# Patient Record
Sex: FEMALE | Race: ASIAN | NOT HISPANIC OR LATINO | URBAN - METROPOLITAN AREA
[De-identification: names, ages, dates, MRNs, and addresses within clinical notes are randomized per-mention and may not be internally consistent; named-entity substitution may affect disease eponyms.]

---

## 2024-10-19 ENCOUNTER — EMERGENCY (EMERGENCY)
Facility: HOSPITAL | Age: 24
LOS: 1 days | Discharge: HOME | End: 2024-10-19
Attending: EMERGENCY MEDICINE | Admitting: EMERGENCY MEDICINE
Payer: COMMERCIAL

## 2024-10-19 VITALS
OXYGEN SATURATION: 100 % | HEIGHT: 63 IN | TEMPERATURE: 98 F | DIASTOLIC BLOOD PRESSURE: 75 MMHG | HEART RATE: 79 BPM | RESPIRATION RATE: 16 BRPM | WEIGHT: 103.62 LBS | SYSTOLIC BLOOD PRESSURE: 110 MMHG

## 2024-10-19 VITALS
SYSTOLIC BLOOD PRESSURE: 120 MMHG | DIASTOLIC BLOOD PRESSURE: 75 MMHG | TEMPERATURE: 98 F | OXYGEN SATURATION: 98 % | RESPIRATION RATE: 16 BRPM | HEART RATE: 68 BPM

## 2024-10-19 LAB
ALBUMIN SERPL ELPH-MCNC: 4.2 G/DL — SIGNIFICANT CHANGE UP (ref 3.3–5)
ALP SERPL-CCNC: 67 U/L — SIGNIFICANT CHANGE UP (ref 30–120)
ALT FLD-CCNC: 20 U/L — SIGNIFICANT CHANGE UP (ref 10–60)
ANION GAP SERPL CALC-SCNC: 9 MMOL/L — SIGNIFICANT CHANGE UP (ref 5–17)
APPEARANCE UR: CLEAR — SIGNIFICANT CHANGE UP
AST SERPL-CCNC: 15 U/L — SIGNIFICANT CHANGE UP (ref 10–40)
BASOPHILS # BLD AUTO: 0.03 K/UL — SIGNIFICANT CHANGE UP (ref 0–0.2)
BASOPHILS NFR BLD AUTO: 0.6 % — SIGNIFICANT CHANGE UP (ref 0–2)
BILIRUB SERPL-MCNC: 0.5 MG/DL — SIGNIFICANT CHANGE UP (ref 0.2–1.2)
BILIRUB UR-MCNC: NEGATIVE — SIGNIFICANT CHANGE UP
BUN SERPL-MCNC: 18 MG/DL — SIGNIFICANT CHANGE UP (ref 7–23)
CALCIUM SERPL-MCNC: 9.5 MG/DL — SIGNIFICANT CHANGE UP (ref 8.4–10.5)
CHLORIDE SERPL-SCNC: 104 MMOL/L — SIGNIFICANT CHANGE UP (ref 96–108)
CO2 SERPL-SCNC: 25 MMOL/L — SIGNIFICANT CHANGE UP (ref 22–31)
COLOR SPEC: YELLOW — SIGNIFICANT CHANGE UP
CREAT SERPL-MCNC: 0.82 MG/DL — SIGNIFICANT CHANGE UP (ref 0.5–1.3)
DIFF PNL FLD: ABNORMAL
EGFR: 102 ML/MIN/1.73M2 — SIGNIFICANT CHANGE UP
EOSINOPHIL # BLD AUTO: 0.01 K/UL — SIGNIFICANT CHANGE UP (ref 0–0.5)
EOSINOPHIL NFR BLD AUTO: 0.2 % — SIGNIFICANT CHANGE UP (ref 0–6)
ERYTHROCYTE [SEDIMENTATION RATE] IN BLOOD: 11 MM/HR — SIGNIFICANT CHANGE UP (ref 0–20)
FLUAV AG NPH QL: SIGNIFICANT CHANGE UP
FLUBV AG NPH QL: SIGNIFICANT CHANGE UP
GLUCOSE SERPL-MCNC: 91 MG/DL — SIGNIFICANT CHANGE UP (ref 70–99)
GLUCOSE UR QL: NEGATIVE MG/DL — SIGNIFICANT CHANGE UP
HCG UR QL: NEGATIVE — SIGNIFICANT CHANGE UP
HCT VFR BLD CALC: 38.3 % — SIGNIFICANT CHANGE UP (ref 34.5–45)
HGB BLD-MCNC: 12.8 G/DL — SIGNIFICANT CHANGE UP (ref 11.5–15.5)
IMM GRANULOCYTES NFR BLD AUTO: 0 % — SIGNIFICANT CHANGE UP (ref 0–0.9)
KETONES UR-MCNC: ABNORMAL MG/DL
LEUKOCYTE ESTERASE UR-ACNC: NEGATIVE — SIGNIFICANT CHANGE UP
LYMPHOCYTES # BLD AUTO: 2.16 K/UL — SIGNIFICANT CHANGE UP (ref 1–3.3)
LYMPHOCYTES # BLD AUTO: 41.1 % — SIGNIFICANT CHANGE UP (ref 13–44)
MCHC RBC-ENTMCNC: 31.2 PG — SIGNIFICANT CHANGE UP (ref 27–34)
MCHC RBC-ENTMCNC: 33.4 G/DL — SIGNIFICANT CHANGE UP (ref 32–36)
MCV RBC AUTO: 93.4 FL — SIGNIFICANT CHANGE UP (ref 80–100)
MONOCYTES # BLD AUTO: 0.6 K/UL — SIGNIFICANT CHANGE UP (ref 0–0.9)
MONOCYTES NFR BLD AUTO: 11.4 % — SIGNIFICANT CHANGE UP (ref 2–14)
NEUTROPHILS # BLD AUTO: 2.45 K/UL — SIGNIFICANT CHANGE UP (ref 1.8–7.4)
NEUTROPHILS NFR BLD AUTO: 46.7 % — SIGNIFICANT CHANGE UP (ref 43–77)
NITRITE UR-MCNC: NEGATIVE — SIGNIFICANT CHANGE UP
NRBC # BLD: 0 /100 WBCS — SIGNIFICANT CHANGE UP (ref 0–0)
PH UR: 5.5 — SIGNIFICANT CHANGE UP (ref 5–8)
PLATELET # BLD AUTO: 232 K/UL — SIGNIFICANT CHANGE UP (ref 150–400)
POTASSIUM SERPL-MCNC: 3.5 MMOL/L — SIGNIFICANT CHANGE UP (ref 3.5–5.3)
POTASSIUM SERPL-SCNC: 3.5 MMOL/L — SIGNIFICANT CHANGE UP (ref 3.5–5.3)
PROT SERPL-MCNC: 7.6 G/DL — SIGNIFICANT CHANGE UP (ref 6–8.3)
PROT UR-MCNC: 30 MG/DL
RAPID RVP RESULT: SIGNIFICANT CHANGE UP
RBC # BLD: 4.1 M/UL — SIGNIFICANT CHANGE UP (ref 3.8–5.2)
RBC # FLD: 12.5 % — SIGNIFICANT CHANGE UP (ref 10.3–14.5)
RSV RNA NPH QL NAA+NON-PROBE: SIGNIFICANT CHANGE UP
S PYO DNA THROAT QL NAA+PROBE: SIGNIFICANT CHANGE UP
SARS-COV-2 RNA SPEC QL NAA+PROBE: SIGNIFICANT CHANGE UP
SARS-COV-2 RNA SPEC QL NAA+PROBE: SIGNIFICANT CHANGE UP
SODIUM SERPL-SCNC: 138 MMOL/L — SIGNIFICANT CHANGE UP (ref 135–145)
SP GR SPEC: 1.03 — HIGH (ref 1–1.03)
UROBILINOGEN FLD QL: 1 MG/DL — SIGNIFICANT CHANGE UP (ref 0.2–1)
WBC # BLD: 5.25 K/UL — SIGNIFICANT CHANGE UP (ref 3.8–10.5)
WBC # FLD AUTO: 5.25 K/UL — SIGNIFICANT CHANGE UP (ref 3.8–10.5)

## 2024-10-19 PROCEDURE — 81001 URINALYSIS AUTO W/SCOPE: CPT

## 2024-10-19 PROCEDURE — 86664 EPSTEIN-BARR NUCLEAR ANTIGEN: CPT

## 2024-10-19 PROCEDURE — 87637 SARSCOV2&INF A&B&RSV AMP PRB: CPT

## 2024-10-19 PROCEDURE — 85652 RBC SED RATE AUTOMATED: CPT

## 2024-10-19 PROCEDURE — 99283 EMERGENCY DEPT VISIT LOW MDM: CPT | Mod: 25

## 2024-10-19 PROCEDURE — 99284 EMERGENCY DEPT VISIT MOD MDM: CPT

## 2024-10-19 PROCEDURE — 85025 COMPLETE CBC W/AUTO DIFF WBC: CPT

## 2024-10-19 PROCEDURE — 36415 COLL VENOUS BLD VENIPUNCTURE: CPT

## 2024-10-19 PROCEDURE — 87651 STREP A DNA AMP PROBE: CPT

## 2024-10-19 PROCEDURE — 71046 X-RAY EXAM CHEST 2 VIEWS: CPT

## 2024-10-19 PROCEDURE — 86663 EPSTEIN-BARR ANTIBODY: CPT

## 2024-10-19 PROCEDURE — 80053 COMPREHEN METABOLIC PANEL: CPT

## 2024-10-19 PROCEDURE — 86665 EPSTEIN-BARR CAPSID VCA: CPT

## 2024-10-19 PROCEDURE — 87798 DETECT AGENT NOS DNA AMP: CPT

## 2024-10-19 PROCEDURE — 0225U NFCT DS DNA&RNA 21 SARSCOV2: CPT

## 2024-10-19 PROCEDURE — 71046 X-RAY EXAM CHEST 2 VIEWS: CPT | Mod: 26

## 2024-10-19 PROCEDURE — 81025 URINE PREGNANCY TEST: CPT

## 2024-10-19 NOTE — ED PROVIDER NOTE - PATIENT PORTAL LINK FT
You can access the FollowMyHealth Patient Portal offered by [facility] by registering at the following website: [email or web address]. By joining [employer]’s FollowMyHealth portal, you will also be able to view your health information using other applications (apps) compatible with our system.

## 2024-10-19 NOTE — ED ADULT NURSE NOTE - NSFALLUNIVINTERV_ED_ALL_ED
Bed/Stretcher in lowest position, wheels locked, appropriate side rails in place/Call bell, personal items and telephone in reach/Instruct patient to call for assistance before getting out of bed/chair/stretcher/Non-slip footwear applied when patient is off stretcher/[place] to call system/Physically safe environment - no spills, clutter or unnecessary equipment/Purposeful proactive rounding/Room/bathroom lighting operational, light cord in reach

## 2024-10-19 NOTE — ED PROVIDER NOTE - OBJECTIVE STATEMENT
24-year-old female with no past medical history presents with complaint of multiple swollen lymph nodes x 6 weeks.  Patient states that she initially noticed two swollen lymph nodes to the left side of her neck 4 to 6 weeks ago.  States that she saw her PCP at her [employer] on 10/16 who found another small lymph node in her left axilla and had blood work done including sed rate, was informed to follow-up in 2 weeks for repeat sed rate and evaluation.  States that she has mild occasional nasal congestion.  Patient and mother states that she did not have any viral testing done with PCP.  Denies fever, chills, nausea, vomiting, cough, chest pain, shortness of breath, abdominal pain, urinary symptoms, body aches, night sweats, weight loss, recent travel, sore throat, difficulty swallowing, rash, headache, neck stiffness or other symptoms. 24-year-old female with no past medical history presents with complaint of multiple swollen lymph nodes x 6 weeks.  Patient states that she initially noticed two swollen lymph nodes to the left side of her neck 4 to 6 weeks ago.  States that she saw her PCP at her [place] on 10/16 who found another small lymph node in her left axilla and had blood work done including sed rate, was informed to follow-up in 2 weeks for repeat sed rate and evaluation.  States that she has mild occasional nasal congestion.  Patient and mother states that she did not have any viral testing done with PCP. Pt currently on her menstruation. Denies fever, chills, nausea, vomiting, cough, chest pain, shortness of breath, abdominal pain, urinary symptoms, body aches, night sweats, weight loss, recent travel, sore throat, difficulty swallowing, rash, headache, neck stiffness or other symptoms. 24-year-old female with no past medical history presents with complaint of multiple swollen lymph nodes x 6 weeks.  Patient states that she initially noticed two swollen lymph nodes to the left side of her neck 4 to 6 weeks ago.  States that she [facility] on 10/[facility] who found another small lymph node in her left axilla and had blood work done, was informed to follow-up in 2 weeks for repeat sed rate and evaluation.  States that she has mild occasional nasal congestion.  Patient and mother states that she did not have any viral testing done with PCP. Pt currently on her menstruation. Denies fever, chills, nausea, vomiting, cough, chest pain, shortness of breath, abdominal pain, urinary symptoms, body aches, night sweats, weight loss, recent travel, sore throat, difficulty swallowing, rash, headache, neck stiffness or other symptoms.

## 2024-10-19 NOTE — ED PROVIDER NOTE - MUSCULOSKELETAL, MLM
Refill per protocol.   Spine appears normal, range of motion is not limited, no muscle or joint tenderness

## 2024-10-19 NOTE — ED PROVIDER NOTE - ENMT NEGATIVE STATEMENT, MLM
Ears: no ear pain and no hearing problems. Mouth/Throat: no dysphagia, no hoarseness and no throat pain. Neck: no lumps, no pain, no stiffness and no swollen glands.

## 2024-10-19 NOTE — ED PROVIDER NOTE - PROGRESS NOTE DETAILS
Reevaluated patient at bedside. Discussed the results of all diagnostic testing in ED and copies of all reports given. Advised outpatient f/u with pcp and may need biopsy for further evaluation. An opportunity to ask questions was given.  Discussed the importance of prompt, close medical follow-up.  Patient will return with any changes, concerns or persistent / worsening symptoms.  Understanding of all instructions verbalized. Reevaluated patient at bedside. Discussed the results of all diagnostic testing in ED and copies of all reports given. rvp sent. Advised outpatient f/u with pcp and may need biopsy for further evaluation. An opportunity to ask questions was given.  Discussed the importance of prompt, close medical follow-up.  Patient will return with any changes, concerns or persistent / worsening symptoms.  Understanding of all instructions verbalized.

## 2024-10-19 NOTE — ED PROVIDER NOTE - ENMT, MLM
Airway patent, Nasal mucosa clear. Mouth with normal mucosa. Throat has no vesicles, no oropharyngeal exudates and uvula is midline. 2 small left anterior cervical lymph nodes palpated, no neck swelling or large lymphadenopathy noted. Airway patent. posterior pharynx clear. Throat has no vesicles, no oropharyngeal exudates and uvula is midline. 2 small ~1 cm palpable lymph nodes noted to left side of neck, non tender and no erythema noted, no axillary lymphadenopathy noted

## 2024-10-19 NOTE — ED PROVIDER NOTE - CLINICAL SUMMARY MEDICAL DECISION MAKING FREE TEXT BOX
24-year-old female with no past medical history presents with complaint of multiple swollen lymph nodes x 6 weeks.  Patient states that she initially noticed two swollen lymph nodes to the left side of her neck 4 to 6 weeks ago.  States that she [facility] P[facility] on 10/[facility] who found another small lymph node in her left axilla and had blood work done including sed rate, was informed to follow-up in 2 weeks for repeat sed rate and evaluation.  States that she has mild occasional nasal congestion.  Patient and mother states that she did not have any viral testing done with PCP.  Denies fever, chills, nausea, vomiting, cough, chest pain, shortness of breath, abdominal pain, urinary symptoms, body aches, night sweats, weight loss, recent travel, sore throat, difficulty swallowing, rash, headache, neck stiffness or other symptoms.    VSS Afebrile, NAD  HEENT - clear, Posterior pharynx clear.  Tonsils not enlarged or red.  PERRL EOMI  Neck supple, Questionable 1 cm palpable lymph node in left side of neck nontender.  No erythema.  lungs clear  Cor S1S2 RR - MGR  Abd soft nontender, no mass or HSM, no rebound  Ext FROM intact, no edema, No axillary or groin lymph nodes palpable.  Neuro Intact, no deficits.  Skin Warm and dry no rash.  Impression lymph node swelling rule out infection.  Less likely lymphoma.  Plan labs chest x-ray swab for strep and flu.  Consider mono test.  May need outpatient follow-up for biopsy.

## 2024-10-19 NOTE — ED ADULT NURSE NOTE - OBJECTIVE STATEMENT
Pt here with several swollen lymph nodes in neck/armpits for about a month now. Reports not really having any sickness symptoms, a slightly sore throat. Denies fevers, cough, etc,

## 2024-10-19 NOTE — ED PROVIDER NOTE - NSFOLLOWUPINSTRUCTIONS_ED_ALL_ED_FT
Follow up with your PCP for possible outpatient biopsy, re-evaluation, ongoing care and treatment. If any related worsening symptoms or concerns, return to the ER.     Lymphadenopathy  Body outline showing the lymphatic system.  Lymphadenopathy is when your lymph glands are swollen or larger than normal.    Lymph glands, also called lymph nodes, are clumps of tissue. They filter germs and waste from tissues in your body to your bloodstream. They're part of your body's defense system, or immune system.    Lymphadenopathy has different causes, like infection, autoimmune disease, and cancer. Lymphadenopathy can happen wherever you have lymph nodes. The type you have depends on which nodes it's in, such as:  Cervical lymphadenopathy. This is in the neck.  Mediastinal lymphadenopathy. This is in the chest.  Hilar lymphadenopathy. This is in the lungs.  Axillary lymphadenopathy. This is in the armpits.  Inguinal lymphadenopathy. This is in the groin.  Sometimes, fluid and cells that fight infection build up in your lymph nodes. This happens when your immune system reacts to germs or other substances that get into your body. This makes lymph nodes swell and get bigger.    Treatment is based on what's thought to be the cause. Sometimes, lymph nodes don't go back to normal size after treatment. If yours don't, your health care provider may order tests to help learn why your glands are still swollen and big.    Follow these instructions at home:  A heating pad for use on the swollen area.  Take over-the-counter and prescription medicines only as told by your provider.  If you were prescribed antibiotics, do not stop using them, even if you start to feel better.  If told, apply heat to swollen lymph nodes as told by your provider. Use the heat source that your provider recommends, such as a moist heat pack or a heating pad.  Place a towel between your skin and the heat source.  Leave the heat on only for the time told by your provider to avoid injury.  If your skin turns bright red, remove the heat right away to prevent burns. The risk of burns is higher if you cannot feel pain, heat, or cold.  Check your swollen lymph nodes every day for changes. Check other places where you have lymph nodes as told. Check for changes such as:  More swelling.  Sudden growth in size.  Redness or pain.  Hardness.  Contact a health care provider if:  You have lymph nodes that:  Are still swollen after 2 weeks.  Have gotten bigger all of a sudden or the swelling spreads.  Are red, painful, or hard.  Fluid leaks from the skin near a swollen lymph node.  You get a fever, chills, or night sweats.  You feel tired.  You have a sore throat.  Your abdomen hurts.  You lose weight without trying.

## 2024-10-21 LAB
EBV EA AB SER IA-ACNC: <5 U/ML — SIGNIFICANT CHANGE UP
EBV EA AB TITR SER IF: POSITIVE
EBV EA IGG SER-ACNC: NEGATIVE — SIGNIFICANT CHANGE UP
EBV NA IGG SER IA-ACNC: >600 U/ML — HIGH
EBV PATRN SPEC IB-IMP: SIGNIFICANT CHANGE UP
EBV VCA IGG AVIDITY SER QL IA: POSITIVE
EBV VCA IGM SER IA-ACNC: <10 U/ML — SIGNIFICANT CHANGE UP
EBV VCA IGM SER IA-ACNC: >750 U/ML — HIGH
EBV VCA IGM TITR FLD: NEGATIVE — SIGNIFICANT CHANGE UP

## 2024-12-04 PROBLEM — Z00.00 ENCOUNTER FOR PREVENTIVE HEALTH EXAMINATION: Status: ACTIVE | Noted: 2024-12-04

## 2024-12-16 ENCOUNTER — APPOINTMENT (OUTPATIENT)
Dept: ULTRASOUND IMAGING | Facility: CLINIC | Age: 24
End: 2024-12-16
Payer: COMMERCIAL

## 2024-12-16 ENCOUNTER — OUTPATIENT (OUTPATIENT)
Dept: OUTPATIENT SERVICES | Facility: HOSPITAL | Age: 24
LOS: 1 days | End: 2024-12-16
Payer: COMMERCIAL

## 2024-12-16 DIAGNOSIS — R59.0 LOCALIZED ENLARGED LYMPH NODES: ICD-10-CM

## 2024-12-16 PROCEDURE — 10005 FNA BX W/US GDN 1ST LES: CPT
